# Patient Record
Sex: FEMALE | Race: OTHER | Employment: UNEMPLOYED | URBAN - METROPOLITAN AREA
[De-identification: names, ages, dates, MRNs, and addresses within clinical notes are randomized per-mention and may not be internally consistent; named-entity substitution may affect disease eponyms.]

---

## 2018-01-22 PROBLEM — M25.562 CHRONIC PAIN OF BOTH KNEES: Status: ACTIVE | Noted: 2018-01-22

## 2018-01-22 PROBLEM — G89.29 CHRONIC PAIN OF BOTH KNEES: Status: ACTIVE | Noted: 2018-01-22

## 2018-01-22 PROBLEM — M25.561 CHRONIC PAIN OF BOTH KNEES: Status: ACTIVE | Noted: 2018-01-22

## 2018-01-22 NOTE — PATIENT INSTRUCTIONS
Encounter to establish care  (primary encounter diagnosis) request records from previous PCP  Other specified hypothyroidism- check TSH today   Chronic knee pain -most likely osteoarthritits, nsaid as needed , exercise , instruction given   frontal headach

## 2018-01-22 NOTE — PROGRESS NOTES
HPI:    Patient ID: Timbo Beatty is a 60year old female. HPI she is here to establish care with new physician . She states she is having chronic pain on her knees , frontal  headache on and off .  She is using glasses  But  she  has not seen eye md for fe Current Outpatient Prescriptions:  Calcium Carbonate-Vitamin D (CALCIUM 600-D) 600-400 MG-UNIT Oral Tab Take by mouth. Disp:  Rfl:    aspirin (ECOTRIN LOW STRENGTH) 81 MG Oral Tab EC Take by mouth.  Disp:  Rfl:    Levothyroxine Sodium 150 MCG Oral Tab T tracheal tenderness present. No tracheal deviation present. No thyroid mass and no thyromegaly present. Cardiovascular: Normal rate, regular rhythm, normal heart sounds and intact distal pulses. Exam reveals no gallop and no friction rub.     No murmur h

## 2018-01-22 NOTE — TELEPHONE ENCOUNTER
Medical record request signed and mailed      Dr MERCADO [facility]  [address] [id number]

## 2018-01-23 NOTE — TELEPHONE ENCOUNTER
Pt has been informed of lab results and the increase in dose on the thyroid medication, pt voiced understanding.

## 2018-04-04 NOTE — TELEPHONE ENCOUNTER
Patient states since taking the higher dose of thyroid medication she has notice severe dry mouth and hair keeps falling off with joint pain. Is there something she should do or just continue taking the medication . Please advice .  German speaking only

## 2018-04-04 NOTE — TELEPHONE ENCOUNTER
She needs to come and we need to check her thyrpid again, can come tomorrow, I put the order and will take form there

## 2018-04-13 NOTE — TELEPHONE ENCOUNTER
Have tried calling several times and line becomes d/c after a few rings, pt has appt at end of April.

## 2018-04-30 PROBLEM — G89.29 CHRONIC MIDLINE LOW BACK PAIN WITHOUT SCIATICA: Status: ACTIVE | Noted: 2018-04-30

## 2018-04-30 PROBLEM — M54.50 CHRONIC MIDLINE LOW BACK PAIN WITHOUT SCIATICA: Status: ACTIVE | Noted: 2018-04-30

## 2018-04-30 NOTE — PATIENT INSTRUCTIONS
ASSESSMENT/PLAN:   Breast cancer screening  (primary encounter diagnosis) Check mammogram. Continue self breast exam every month. Other specified hypothyroidism Clinically, hypothyroid. Check blood. Osteopenia, unspecified location Dexa done 2017. balanced. They also support your body when you move. By distributing your weight throughout your spine, the curves make back injuries less likely. Strong, flexible muscles  Strong, flexible back muscles help support the three curves of the spine.  They do los trastornos [employer], la artritis en las articulaciones vertebrales o la estenosis (estrechamiento) del canal medular pueden convertirse en problemas crónicos y durar meses o años. [address]:  1.  PARA EL DOLOR DE GENA: Use dixie almohada libras hasta que el dolor haya desaparecido por completo. Programe dixie VISITA DE CONTROL con gaona médico o con dorinda centro si los síntomas no empiezan a mejorar después de Mervat Audrey. Es posible que necesite fisioterapia o pruebas adicionales.   [NOTA: Si le espalda.   Músculos benjy y flexibles  Es importante tener músculos benjy y flexibles en la espalda, para ayudar la columna a sostener las shaila curvas dorsales y mantener la alineación correcta de las vértebras y los discos; y también en el abdomen, las vertebral.   · Kelley Garcia y angyr a paso rápido son buenas formas de ejercicio para mejorar gaona nivel de forma física. · Practique técnicas seguras para levantar objetos (juarez la información que se presenta más abajo).   · Adopte posturas correctas al estar sen izquierda [employer], con la espalda plana contra el piso. Sostenga la posición cirilo 5 segundos. · Relájese y repita el ejercicio con la rodilla derecha. · Repita el ejercicio 10 veces con cada pierna.   [employer]ant[employer] Giovani [employer] Segur donde duerme, cámbielo o use dixie tabla de zazueta contrachapada de media pulgada de espesor debajo del colchón para darle más soporte.   Programe dixie VISITA DE CONTROL con gaona médico, o según le indique nuestro personal médico.  [NOTA: Si le hicieron radiogra espalda contra el piso. Sostenga esta posición cirilo 5 segundos. · Relájese y repita el ejercicio con la rodilla derecha. · Shana dorinda ejercicio 10 veces con cada titona.   · Repita el ejercicio [address] y presionándolas contra el pecho de la espalda contra el suelo. Mientras mantiene la tensión de letitia músculos abdominales, respire dixie vez más y [employer] omóplatos del piso (esta posición no equivale a la de sentadilla completa).  Mantenga la ora alineada con el christy del cuerpo (no d radiografías (X-rays) y otras pruebas más adelante. [address]:  7. Es posible que necesite permanecer en cama los primeros días.  Linsey, tan pronto mark le sea posible, comience a sentarse o pararse para evitar los problemas que pueden aparecer cu ga.  Programe dixie VISITA DE CONTROL con gaona médico o dorinda centro si no empieza a sentirse mejor cirilo la semana próxima. Demetra Timewell necesite obtener terapia física (physical therapy).   [NOTA: Si le barnett hecho radiografías (X-rays), éstas serán evaluadas por tocarse y a comprimir el nervio.  A menudo, en el punto de roce de las vértebras crece dixie prominencia anormal de hueso (denominada espolón óseo), la cual puede provocar dixie estenosis (estrechamiento) del foramen o canal haque que genera presión contra un flexibilidad para prevenir nuevas lesiones. Pida a gaona médico que le aconseje ejercicios específicos para la espalda. Mantenga dixie buena postura para prevenir futuras lesiones  · Al moverse, doble las caderas y Gwenyth Kroner.  No doble la cintura ni gire el y la ora contra la pared. Sentirá dixie leve tensión en los hombros.  15. Mantenga esta posición y cuente WATZING 5. Luego bote Dru Rocher y relaje los hombros y el marvin.   Flexión del cuerpo  [employer] ayuda a fortalecer los músculos de la espalda y (desde los hombros [employer] caderas) se vuelva más flexible. 8. Siéntese en el piso con las piernas cruzadas. Gaona espalda debe estar derecha. 9. Ponga gaona mano izquierda sobre gaona Nishi Edman.  Coloque gaona mano derecha en el piso para apoyarse y ayudar a espolones óseos, capaces de estrechar el foramen o canal medular y provocar dixie estenosis. [place] también ejerce presión contra los nervios.    En algunos casos, las vértebras se desestabilizan y se deslizan hacia adelante; surge entonces la espondilolistesis kenji. · Pruebe a bañarse o ducharse con agua tibia. También puede usar dixie almohadilla térmica graduada en bajo. Para evitar quemarse, mantenga un paño entre gaona piel y la almohadilla térmica.   · No use dixie almohadilla térmica cirilo más de 15 minutos c conecte los puntos. · Si es necesario, mueva la pelvis ligeramente hacia adelante. Date Last Reviewed: 10/28/2015  © 8795-6603 [employer] [address]. [address] [facility], [address] [facility]. Todos los derechos reservados.  Esta información no prete proveedor de [employer] o farmacéutico si tiene alguna pregunta. Camine todos los días  Shana dixie caminata diaria para conservar la flexibilidad y la fuerza de los músculos de la espalda y los muslos. De esta forma, gaona espalda contará con mejor apoyo. tommy en el estómago, las nalgas y los muslos ayuda a que la espalda tenga que hacer menos esfuerzo. Date Last Reviewed: 8/31/2015  © 5504-7556 [employer] [address]. [address] [facility], [address] [place] [address] Friona. Todos los derechos reservados.  Esta info diagnosticar y tratar un problema de judith. Seguridad de la espalda: Los principios de dixie buena postura  La buena postura lo protege contra lesiones y lo hace sentirse más cómodo; propóngase mantener dixie postura adecuada todo el día.   Revise gaona pos tacón alto. · Al sentarse y dormir. Elija letitia muebles con cuidado; asegúrese de que no le vayan a causar o empeorar un dolor de espalda.  Las thomas deben permitirle sentarse cómodamente con dixie postura correcta; si es necesario, use almohadas para brindar profesional. Nolan Johnny gaona médico puede diagnosticar y tratar un problema de judith.         Ejercicios para la espalda: Estiramiento de rotadores de cadera    Para comenzar, acuéstese boca arriba con las rodillas flexionadas y las plantas de los pies apoyadas en judith.        Ejercicios para la espalda: Estiramiento de la parte inferior de la espalda    Para empezar, siéntese en dixie silla, apoyando las plantas de los pies en el suelo.  Desplace gaona peso ligeramente hacia adelante para no arquear la espalda.   Reláje gaona peso ligeramente hacia adelante para no arquear la espalda. Relájese. Mantenga las Gene, los hombros y las caderas alineados entre sí:  · Estire el brazo derecho por encima de la ora. · Inclínese lentamente a la izquierda, sin torcer el torso.  Det

## 2018-04-30 NOTE — PROGRESS NOTES
HPI:    Patient ID: Casi Reno is a 60year old female. Casi Reno is a 60year old female who presents for a complete physical exam.   HPI:   Patient presents with: Follow - Up: Here today to follow up on thyroid problem.      Here to establish care with History:  No date: CHOLECYSTECTOMY  2006: COLONOSCOPY  No date: HYSTERECTOMY      Comment: 46 y/o. EDER and BSO and endometriosis and                cysts. no abNL paps. History reviewed. No pertinent family history.    Social History:  Social History lb 6.4 oz (71.4 kg)    BP Readings from Last 3 Encounters:  04/30/18 : 121/78  01/22/18 : 134/79  02/25/13 : 124/75    Labs:     Lab Results  Component Value Date/Time    10/16/2006 10:05 AM   K 4.1 10/16/2006 10:05 AM    10/16/2006 10:05 AM discharge and vaginal pain. Musculoskeletal: Positive for back pain. Skin: Negative for rash. Allergic/Immunologic: Negative for environmental allergies.    Neurological: Negative for dizziness, tingling, tremors, seizures, syncope, facial asymmetry, Constitutional: She is oriented to person, place, and time. Vital signs are normal. She appears well-developed and well-nourished. No distress. HENT:   Head: Normocephalic and atraumatic.    Right Ear: Tympanic membrane, external ear and ear canal dexter and no hordeolum. No foreign body present in the left eye. Right conjunctiva is not injected. Right conjunctiva has no hemorrhage. Left conjunctiva is not injected. Left conjunctiva has no hemorrhage. No scleral icterus.  Right eye exhibits normal extraocul deformity and no laceration.         Right knee: She exhibits normal range of motion, no swelling, no effusion, no ecchymosis, no deformity, no laceration, no erythema, normal alignment, no LCL laxity, normal patellar mobility, no bony tenderness, normal me Neurological: She is alert and oriented to person, place, and time. She displays no atrophy and no tremor. She exhibits normal muscle tone. Reflex Scores:       Patellar reflexes are 2+ on the right side and 2+ on the left side.   Skin: Skin is warm and

## 2018-05-05 NOTE — TELEPHONE ENCOUNTER
Pt was informed that we haven't receive blood test results. Will call Monday and request them. Verbalized understanding.

## 2018-05-08 NOTE — TELEPHONE ENCOUNTER
Called [employer] faxing lab results over for Dr. Ximena Bonds to review.    Told patient we will call her on Wednesday

## 2018-05-08 NOTE — TELEPHONE ENCOUNTER
Jordanian speaking pt waiting since 04-30 for her tests results. pt is concern because has been a week since she had it done

## 2018-05-10 PROBLEM — E03.9 ACQUIRED HYPOTHYROIDISM: Status: ACTIVE | Noted: 2018-05-10

## 2018-05-10 PROBLEM — E78.00 HIGH CHOLESTEROL: Status: ACTIVE | Noted: 2018-05-10

## 2018-05-10 NOTE — TELEPHONE ENCOUNTER
Just received results today. Sugars normal, electrolytes are normal kidney and liver functions are normal. White count and cell counts are all normal, including hemoglobin hematocrit. Thyroid is abnormal.  Which may explain palpitations and hot flashes.

## 2018-05-10 NOTE — TELEPHONE ENCOUNTER
Patient was informed of all lab test results. Verbalized understanding. No questions or concerns at the time of call. Nurse visit for TSH recheck scheduled.

## 2018-05-18 NOTE — TELEPHONE ENCOUNTER
Pt has been informed of the back X-ray but she states that she is still in a lot of pain and has been taking pain med more often, please advise.

## 2018-05-20 NOTE — TELEPHONE ENCOUNTER
May be better to try a trial of physical therapy and follow-up after physical therapy. Orders written. Also may be try a little bit of gabapentin 300 nightly. Next sent. Is not can work right away takes full couple weeks to work.

## 2018-05-23 NOTE — TELEPHONE ENCOUNTER
Pt has been informed of Md's advise, pt voiced understanding and asked that PT order be mailed to her. Order mailed.

## 2018-10-19 NOTE — TELEPHONE ENCOUNTER
Cough for one week could be due to viral infection as well, we can order cxr to make sure no pneumonia. I will send tessalon pearls for cough if nay fevr, chills , sob  Needs to call us. She needs shelia.

## 2018-10-19 NOTE — TELEPHONE ENCOUNTER
Patient with dry cough for 1 week has a little bit of phlegm no fever she is asking if she can have an rx until she come in next week to see MD

## 2018-10-23 NOTE — PROGRESS NOTES
HPI:    Patient ID: Caridad Ramirez is a 60year old female. HPI  Here for F/U thyroid. Also, cough. Review of Systems   Constitutional: Negative for activity change, appetite change, chills, diaphoresis, fatigue, fever and unexpected weight change. 1 tablet (150 mcg total) by mouth before breakfast. Disp: 90 tablet Rfl: 1   Calcium Carbonate-Vitamin D (CALCIUM 600-D) 600-400 MG-UNIT Oral Tab Take by mouth. Disp:  Rfl:    aspirin (ECOTRIN LOW STRENGTH) 81 MG Oral Tab EC Take by mouth.  Disp:  Rfl:    A Nose: Nose normal. No mucosal edema, rhinorrhea, nose lacerations, sinus tenderness, nasal deformity or nasal septal hematoma. No epistaxis. No foreign bodies. Right sinus exhibits no maxillary sinus tenderness and no frontal sinus tenderness.  Left sinu no tonsillar, no preauricular, no posterior auricular and no occipital adenopathy present. Head (left side): No submental, no submandibular, no tonsillar, no preauricular, no posterior auricular and no occipital adenopathy present.      She has no ce

## 2018-10-23 NOTE — PATIENT INSTRUCTIONS
ASSESSMENT/PLAN:   Acquired hypothyroidism  (primary encounter diagnosis) Check blood. High cholesterol Check blood. Cough ? RAD. Check singuilar 10 mg at night. Call if no better in 7 days.  Discussed with patient of side effects and use of these m

## 2018-10-28 NOTE — PROGRESS NOTES
CMP Normal (electrolytes, sugar, kidney and liver functions),   Lipid (choilesterol) is worse than prior. Careful with diet. Avoid red meat, shellfish, pork. Try not to lora foods. Exercise at least 30 minutes 4 times a week. Lower carb diet.   Recheck

## 2019-04-11 NOTE — TELEPHONE ENCOUNTER
Please review; protocol failed.     Requested Prescriptions     Pending Prescriptions Disp Refills   • LEVOTHYROXINE SODIUM 137 MCG Oral Tab [Pharmacy Med Name: LEVOTHYROXINE 137 MCG TABLET] 90 tablet 0     Sig: Take 137 mcg by mouth before breakfast.

## 2021-04-22 NOTE — TELEPHONE ENCOUNTER
Pt states that she now has a new PCP and Medicare insurance, no longer with [facility].

## 2024-03-11 ENCOUNTER — HOSPITAL ENCOUNTER (OUTPATIENT)
Dept: GENERAL RADIOLOGY | Facility: HOSPITAL | Age: 69
Discharge: HOME | End: 2024-03-11
Attending: INTERNAL MEDICINE
Payer: MEDICARE

## 2024-03-11 ENCOUNTER — HOSPITAL ENCOUNTER (OUTPATIENT)
Dept: BONE DENSITY | Facility: HOSPITAL | Age: 69
Discharge: HOME | End: 2024-03-11
Attending: INTERNAL MEDICINE
Payer: MEDICARE

## 2024-03-11 ENCOUNTER — HOSPITAL ENCOUNTER (OUTPATIENT)
Dept: MAMMOGRAPHY | Facility: HOSPITAL | Age: 69
Discharge: HOME | End: 2024-03-11
Attending: INTERNAL MEDICINE
Payer: MEDICARE

## 2024-03-11 DIAGNOSIS — Z13.820 SCREENING FOR OSTEOPOROSIS: ICD-10-CM

## 2024-03-11 DIAGNOSIS — Z12.31 ENCOUNTER FOR SCREENING MAMMOGRAM FOR MALIGNANT NEOPLASM OF BREAST: ICD-10-CM

## 2024-03-11 DIAGNOSIS — R13.10 DYSPHAGIA: ICD-10-CM

## 2024-03-11 PROCEDURE — 74246 X-RAY XM UPR GI TRC 2CNTRST: CPT | Performed by: INTERNAL MEDICINE

## 2024-03-11 PROCEDURE — 71046 X-RAY EXAM CHEST 2 VIEWS: CPT | Performed by: INTERNAL MEDICINE

## 2024-03-11 PROCEDURE — 77063 BREAST TOMOSYNTHESIS BI: CPT | Performed by: INTERNAL MEDICINE

## 2024-03-11 PROCEDURE — 77067 SCR MAMMO BI INCL CAD: CPT | Performed by: INTERNAL MEDICINE

## 2024-04-19 ENCOUNTER — HOSPITAL ENCOUNTER (OUTPATIENT)
Dept: BONE DENSITY | Age: 69
Discharge: HOME | End: 2024-04-19
Attending: INTERNAL MEDICINE
Payer: MEDICARE

## 2024-04-19 PROCEDURE — 77080 DXA BONE DENSITY AXIAL: CPT | Performed by: INTERNAL MEDICINE

## 2024-06-04 ENCOUNTER — HOSPITAL ENCOUNTER (EMERGENCY)
Facility: HOSPITAL | Age: 69
Discharge: HOME | End: 2024-06-04
Attending: EMERGENCY MEDICINE
Payer: MEDICARE

## 2024-06-04 ENCOUNTER — APPOINTMENT (OUTPATIENT)
Dept: CT IMAGING | Facility: HOSPITAL | Age: 69
End: 2024-06-04
Attending: EMERGENCY MEDICINE
Payer: MEDICARE

## 2024-06-04 VITALS
SYSTOLIC BLOOD PRESSURE: 154 MMHG | DIASTOLIC BLOOD PRESSURE: 85 MMHG | HEART RATE: 65 BPM | WEIGHT: 160 LBS | RESPIRATION RATE: 18 BRPM | TEMPERATURE: 98 F | OXYGEN SATURATION: 95 % | BODY MASS INDEX: 30.21 KG/M2 | HEIGHT: 61 IN

## 2024-06-04 DIAGNOSIS — R10.12 ABDOMINAL PAIN, LEFT UPPER QUADRANT: Primary | ICD-10-CM

## 2024-06-04 LAB
ALBUMIN SERPL-MCNC: 4.5 G/DL (ref 3.2–4.8)
ALBUMIN/GLOB SERPL: 1.6 {RATIO} (ref 1–2)
ALP LIVER SERPL-CCNC: 86 U/L
ALT SERPL-CCNC: 16 U/L
ANION GAP SERPL CALC-SCNC: 7 MMOL/L (ref 0–18)
AST SERPL-CCNC: 28 U/L (ref ?–34)
BASOPHILS # BLD AUTO: 0.04 X10(3) UL (ref 0–0.2)
BASOPHILS NFR BLD AUTO: 0.4 %
BILIRUB SERPL-MCNC: 0.9 MG/DL (ref 0.2–1.1)
BILIRUB UR QL: NEGATIVE
BUN BLD-MCNC: 10 MG/DL (ref 9–23)
BUN/CREAT SERPL: 11.4 (ref 10–20)
CALCIUM BLD-MCNC: 9.3 MG/DL (ref 8.7–10.4)
CHLORIDE SERPL-SCNC: 104 MMOL/L (ref 98–112)
CLARITY UR: CLEAR
CO2 SERPL-SCNC: 26 MMOL/L (ref 21–32)
CREAT BLD-MCNC: 0.88 MG/DL
DEPRECATED RDW RBC AUTO: 42.5 FL (ref 35.1–46.3)
EGFRCR SERPLBLD CKD-EPI 2021: 71 ML/MIN/1.73M2 (ref 60–?)
EOSINOPHIL # BLD AUTO: 0.04 X10(3) UL (ref 0–0.7)
EOSINOPHIL NFR BLD AUTO: 0.4 %
ERYTHROCYTE [DISTWIDTH] IN BLOOD BY AUTOMATED COUNT: 14.1 % (ref 11–15)
GLOBULIN PLAS-MCNC: 2.8 G/DL (ref 2–3.5)
GLUCOSE BLD-MCNC: 119 MG/DL (ref 70–99)
GLUCOSE UR-MCNC: NORMAL MG/DL
HCT VFR BLD AUTO: 40.9 %
HGB BLD-MCNC: 13.8 G/DL
HGB UR QL STRIP.AUTO: NEGATIVE
IMM GRANULOCYTES # BLD AUTO: 0.06 X10(3) UL (ref 0–1)
IMM GRANULOCYTES NFR BLD: 0.6 %
KETONES UR-MCNC: NEGATIVE MG/DL
LEUKOCYTE ESTERASE UR QL STRIP.AUTO: NEGATIVE
LYMPHOCYTES # BLD AUTO: 2.28 X10(3) UL (ref 1–4)
LYMPHOCYTES NFR BLD AUTO: 23.3 %
MCH RBC QN AUTO: 28 PG (ref 26–34)
MCHC RBC AUTO-ENTMCNC: 33.7 G/DL (ref 31–37)
MCV RBC AUTO: 83.1 FL
MONOCYTES # BLD AUTO: 0.47 X10(3) UL (ref 0.1–1)
MONOCYTES NFR BLD AUTO: 4.8 %
NEUTROPHILS # BLD AUTO: 6.88 X10 (3) UL (ref 1.5–7.7)
NEUTROPHILS # BLD AUTO: 6.88 X10(3) UL (ref 1.5–7.7)
NEUTROPHILS NFR BLD AUTO: 70.5 %
NITRITE UR QL STRIP.AUTO: NEGATIVE
OSMOLALITY SERPL CALC.SUM OF ELEC: 284 MOSM/KG (ref 275–295)
PH UR: 7 [PH] (ref 5–8)
PLATELET # BLD AUTO: 218 10(3)UL (ref 150–450)
POTASSIUM SERPL-SCNC: 4.2 MMOL/L (ref 3.5–5.1)
PROT SERPL-MCNC: 7.3 G/DL (ref 5.7–8.2)
PROT UR-MCNC: NEGATIVE MG/DL
RBC # BLD AUTO: 4.92 X10(6)UL
SODIUM SERPL-SCNC: 137 MMOL/L (ref 136–145)
SP GR UR STRIP: 1.01 (ref 1–1.03)
UROBILINOGEN UR STRIP-ACNC: NORMAL
WBC # BLD AUTO: 9.8 X10(3) UL (ref 4–11)

## 2024-06-04 PROCEDURE — 74176 CT ABD & PELVIS W/O CONTRAST: CPT | Performed by: EMERGENCY MEDICINE

## 2024-06-04 PROCEDURE — 99285 EMERGENCY DEPT VISIT HI MDM: CPT

## 2024-06-04 PROCEDURE — 80053 COMPREHEN METABOLIC PANEL: CPT

## 2024-06-04 PROCEDURE — 99284 EMERGENCY DEPT VISIT MOD MDM: CPT

## 2024-06-04 PROCEDURE — 80053 COMPREHEN METABOLIC PANEL: CPT | Performed by: EMERGENCY MEDICINE

## 2024-06-04 PROCEDURE — 81003 URINALYSIS AUTO W/O SCOPE: CPT | Performed by: EMERGENCY MEDICINE

## 2024-06-04 PROCEDURE — 85025 COMPLETE CBC W/AUTO DIFF WBC: CPT | Performed by: EMERGENCY MEDICINE

## 2024-06-04 PROCEDURE — 81003 URINALYSIS AUTO W/O SCOPE: CPT

## 2024-06-04 PROCEDURE — 36415 COLL VENOUS BLD VENIPUNCTURE: CPT

## 2024-06-04 PROCEDURE — 85025 COMPLETE CBC W/AUTO DIFF WBC: CPT

## 2024-06-04 RX ORDER — ACETAMINOPHEN 500 MG
1000 TABLET ORAL ONCE
Status: COMPLETED | OUTPATIENT
Start: 2024-06-04 | End: 2024-06-04

## 2024-06-04 NOTE — ED INITIAL ASSESSMENT (HPI)
Pt arrives ambulatory to ED for c/o LLQ abd pain radiating to back/flank since 5 am today. Pt denies dysuria, states urgency to go. +nausea, denies diarrhea/vomiting. Pt took tylenol with no relief. Denies Hx kidney stones, hx of UTIs. Aox4, speaking in full sentences.     Daughter translating.

## 2024-06-05 NOTE — ED PROVIDER NOTES
Patient Seen in: [facility] Emergency Department    History     Chief Complaint   Patient presents with    Abdomen/Flank Pain       HPI    The patient presents to the ED complaining of left sided abdominal pain since 5 this morning that radiates towards her back.  She states pain is constant with associated nausea.  Denies other complaints.  Denies past abdominal problems.    History reviewed.   Past Medical History:    Hypothyroidism       History reviewed.   Past Surgical History:   Procedure Laterality Date    Cholecystectomy      Colonoscopy  2006    Hysterectomy      50 y/o. EDER and BSO and endometriosis and cysts. no abNL paps.          Medications :  (Not in a hospital admission)       No family history on file.    Smoking Status:   Social History     Socioeconomic History    Marital status:    Occupational History    Occupation: Homemaker.    Tobacco Use    Smoking status: Never    Smokeless tobacco: Never   Substance and Sexual Activity    Alcohol use: No    Drug use: No    Sexual activity: Yes     Partners: Male     Comment: .        Constitutional and vital signs reviewed.      Social History and Family History elements reviewed from today, pertinent positives to the presenting problem noted.    Physical Exam     ED Triage Vitals [06/04/24 1506]   /84   Pulse 68   Resp 18   Temp 97.7 °F (36.5 °C)   Temp src Temporal   SpO2 97 %   O2 Device None (Room air)       All measures to prevent infection transmission during my interaction with the patient were taken. Handwashing was performed prior to and after the exam.  Stethoscope and any equipment used during my examination was cleaned with super sani-cloth germicidal wipes following the exam.     Physical Exam  Vitals and nursing note reviewed.   Constitutional:       General: She is not in acute distress.     Appearance: She is well-developed.   HENT:      Head: Normocephalic and atraumatic.   Eyes:      General:         Right  eye: No discharge.         Left eye: No discharge.      Conjunctiva/sclera: Conjunctivae normal.   Neck:      Trachea: No tracheal deviation.   Cardiovascular:      Rate and Rhythm: Normal rate.   Pulmonary:      Effort: Pulmonary effort is normal. No respiratory distress.      Breath sounds: No stridor.   Abdominal:      General: There is no distension.      Palpations: Abdomen is soft.      Tenderness: There is abdominal tenderness in the left upper quadrant. There is no guarding or rebound.   Musculoskeletal:         General: No deformity.   Skin:     General: Skin is warm and dry.   Neurological:      Mental Status: She is alert and oriented to person, place, and time.   Psychiatric:         Mood and Affect: Mood normal.         Behavior: Behavior normal.         ED Course        Labs Reviewed   COMP METABOLIC PANEL (14) - Abnormal; Notable for the following components:       Result Value    Glucose 119 (*)     All other components within normal limits   CBC WITH DIFFERENTIAL WITH PLATELET    Narrative:     The following orders were created for panel order CBC With Differential With Platelet.                  Procedure                               Abnormality         Status                                     ---------                               -----------         ------                                     CBC W/ DIFFERENTIAL[[id number]]                              Final result                                                 Please view results for these tests on the individual orders.   URINALYSIS WITH CULTURE REFLEX   RAINBOW DRAW LAVENDER   RAINBOW DRAW LIGHT GREEN   CBC W/ DIFFERENTIAL       As Interpreted by me    Imaging Results Available and Reviewed while in ED: CT ABDOMEN+PELVIS KIDNEYSTONE 2D RNDR(NO IV,NO ORAL)(CPT=74176)    Result Date: 6/4/2024  CONCLUSION:   No acute abnormality in the abdomen or pelvis.  No finding to explain left-sided pain.  No renal or ureteral stones or hydronephrosis.   No small bowel obstruction.  Status post cholecystectomy with no biliary ductal dilatation.  Additional chronic or incidental findings are described in the body of this report.    elm-remote    Dictated by (CST): Contreras Campa MD on 6/04/2024 at 6:33 PM     Finalized by (CST): Contreras Campa MD on 6/04/2024 at 6:37 PM         ED Medications Administered:   Medications   acetaminophen (Tylenol Extra Strength) tab 1,000 mg (1,000 mg Oral Given 6/4/24 1751)         MDM     Vitals:    06/04/24 1506 06/04/24 1851   BP: 155/84 154/85   Pulse: 68 65   Resp: 18 18   Temp: 97.7 °F (36.5 °C)    TempSrc: Temporal    SpO2: 97% 95%   Weight: 72.6 kg    Height: 154.9 cm (5' 1\")      *I personally reviewed and interpreted all ED vitals.    Pulse Ox: 95%, Room air, Normal     Differential Diagnosis/ Diagnostic Considerations: Diverticulitis, urolithiasis, SBO, nonspecific bowel pain, other    Complicating Factors: The patient already has does not have any pertinent problems on file. to contribute to the complexity of this ED evaluation.    Medical Decision Making  Patient presents to the ED with left-sided abdominal pain.  Point tenderness to the left upper quadrant on exam.  CT without concerning findings and laboratory testing without abnormalities.  Patient reassured and stable for discharge with outpatient follow-up.    Problems Addressed:  Abdominal pain, left upper quadrant: acute illness or injury that poses a threat to life or bodily functions    Amount and/or Complexity of Data Reviewed  Labs: ordered. Decision-making details documented in ED Course.  Radiology: ordered and independent interpretation performed. Decision-making details documented in ED Course.     Details: Personally reviewed the patient's CT abdomen pelvis images and noted no SBO    Risk  OTC drugs.        Condition upon leaving the department: Stable    Disposition and Plan     Clinical Impression:  1. Abdominal pain, left upper quadrant         Disposition:  Discharge    Follow-up:  Nataly Miguel  [address] [facility]  [address] [place] [address] [id number] [phone]    Schedule an appointment as soon as possible for a visit in 3 day(s)        Medications Prescribed:  Discharge Medication List as of 6/4/2024  6:47 PM

## 2025-02-18 ENCOUNTER — OFFICE VISIT (OUTPATIENT)
Facility: CLINIC | Age: 70
End: 2025-02-18
Payer: MEDICARE

## 2025-02-18 VITALS
BODY MASS INDEX: 28.7 KG/M2 | HEART RATE: 62 BPM | DIASTOLIC BLOOD PRESSURE: 78 MMHG | WEIGHT: 152 LBS | HEIGHT: 61 IN | OXYGEN SATURATION: 98 % | SYSTOLIC BLOOD PRESSURE: 130 MMHG

## 2025-02-18 DIAGNOSIS — M85.80 OSTEOPENIA, UNSPECIFIED LOCATION: ICD-10-CM

## 2025-02-18 DIAGNOSIS — E03.9 ACQUIRED HYPOTHYROIDISM: Primary | ICD-10-CM

## 2025-02-18 PROCEDURE — 1159F MED LIST DOCD IN RCRD: CPT | Performed by: STUDENT IN AN ORGANIZED HEALTH CARE EDUCATION/TRAINING PROGRAM

## 2025-02-18 PROCEDURE — 1160F RVW MEDS BY RX/DR IN RCRD: CPT | Performed by: STUDENT IN AN ORGANIZED HEALTH CARE EDUCATION/TRAINING PROGRAM

## 2025-02-18 PROCEDURE — 3078F DIAST BP <80 MM HG: CPT | Performed by: STUDENT IN AN ORGANIZED HEALTH CARE EDUCATION/TRAINING PROGRAM

## 2025-02-18 PROCEDURE — 3008F BODY MASS INDEX DOCD: CPT | Performed by: STUDENT IN AN ORGANIZED HEALTH CARE EDUCATION/TRAINING PROGRAM

## 2025-02-18 PROCEDURE — 3075F SYST BP GE 130 - 139MM HG: CPT | Performed by: STUDENT IN AN ORGANIZED HEALTH CARE EDUCATION/TRAINING PROGRAM

## 2025-02-18 PROCEDURE — 99204 OFFICE O/P NEW MOD 45 MIN: CPT | Performed by: STUDENT IN AN ORGANIZED HEALTH CARE EDUCATION/TRAINING PROGRAM

## 2025-02-18 NOTE — H&P
[facility] Note    Name: Kaylie Wolff    Date: 2/18/2025    Kaylie Wolff is a 69 year old female who presents for evaluation of hypoTH management.     Chief complaint: New Patient (Establish care, management for hypothyroidism currently on levothyroxine 150mcg no concerns)       Subjective:   Initial HPI consult - 2/18/2025  Thyroid Hx:  -Diagnosed with hyperthyroidism around 16 years ago and completed radioiodine ablation. She has been on LT4 since. Initially saw endocrinology but has not seen once since.   -Medication Hx: LT4  -Family history- sisters with hx of thyroid disease    [] History of head/neck radiation   [x] Recent illness or contrast exposure  [x] Biotin Use  [] Amiodarone Use  [] Tobacco Use Hx     -Currently taking Thryoid Meds: LT4 150 mcg daily   -July 2024 told to take 150 mcg every other day  -August 2024 told to resume daily       she endorses temeperature intolerance and some instances of low voice   Denies other symptoms of compressive      History/Other:    Allergies, PMH, SocHx and FHx reviewed and updated as appropriate in Epic on    Levothyroxine Sodium 137 MCG Oral Tab Take 137 mcg by mouth every morning before breakfast. 90 tablet 1     Allergies[1]  Current Outpatient Medications   Medication Sig Dispense Refill    Levothyroxine Sodium 137 MCG Oral Tab Take 137 mcg by mouth every morning before breakfast. 90 tablet 1    Montelukast Sodium (SINGULAIR) 10 MG Oral Tab Take 1 tablet (10 mg total) by mouth nightly. 7 tablet 0    benzonatate 100 MG Oral Cap Take 1 capsule (100 mg total) by mouth 3 (three) times daily as needed. 30 capsule 0    gabapentin 300 MG Oral Cap Take 1 capsule (300 mg total) by mouth nightly. 30 capsule 3    Calcium Carbonate-Vitamin D (CALCIUM 600-D) 600-400 MG-UNIT Oral Tab Take by mouth.      aspirin (ECOTRIN LOW STRENGTH) 81 MG Oral Tab EC Take by mouth.      Alendronate Sodium 35 MG Oral Tab Take 35 mg by mouth every 7 days.       naproxen 500 MG Oral Tab Take 500 mg by mouth 2 (two) times daily with meals.       Past Medical History:    Hypothyroidism     History reviewed. No pertinent family history.  Social history: Reviewed.      ROS/Exam    REVIEW OF SYSTEMS: Ten point review of systems has been performed and is otherwise negative and/or non-contributory, except as described above.     VITALS  There were no vitals filed for this visit.    Wt Readings from Last 6 Encounters:   06/04/24 160 lb (72.6 kg)   10/23/18 149 lb (67.6 kg)   04/30/18 160 lb (72.6 kg)   01/22/18 160 lb (72.6 kg)       PHYSICAL EXAM  CONSTITUTIONAL:  awake, alert, cooperative, no apparent distress, and appears stated age    PSYCH: normal affect  LUNGS: breathing comfortably  CARDIOVASCULAR:  regular rate   NECK:  no palpable thyroid nodules      Labs/Imaging: Pertinent imaging reviewed.    Assessment & Plan:     ICD-10-CM    1. Acquired hypothyroidism  E03.9 TSH and Free T4 [E]      2. Osteopenia, unspecified location  M85.80           Kaylie Wolff is a pleasant 69 year old female here for evaluation of:    #Hypothyroidism  Lab Results   Component Value Date    TSH 0.10 (L) 10/23/2018    T4F 0.71 01/22/2018      PMHx of Hypothyroidism diagnosed in 2009.        - Pathophysiology of condition, goals of therapy,  d/w pt in detail.   - clinical significance of thyroid and antibody testing discussed   -Patient endorses cold intolerance    -2018 TFTs with TSH low      -Patient is compliant with current LT4 administration and is taking it appropriately   -Discussed with pt the proper administration of LT4: ideally first thing in the morning on an empty stomach and taken only with water, separate from all other foods/beverages/medications by 30-60 minutes.   -Once thyroid lab normalizes, associated symptoms directly related to the hypothyroidism should improved  - Pt aware that if symptoms do not improve despite normalization of thyroid lab, that they are  not likely related to their thyroid condition and that continued follow up with PCP is indicated.    Plan:  - med changes: Thryoid Meds: levothyroxine Tabs - 137 MCG  - lab orders placed and will discuss next steps once all labs result      #Osteopenia   4/2024 DXA with lowest t-score of -1.3 in lumbar spine; moderate FRAX  Repeat DXA 4/2026  Discussed 1200 mg of calcium and 1000 units of vitamin D daily as general recommendations    Return in about 6 months (around 8/18/2025).    The above plan was discussed in detail with the patient who verbalized understanding and agreement.      Emma Camacho DO  [facility]  2/18/2025     In reviewing this note, please be advised that Dragon Voice Recognition software used to dictate the note may have made errors in recognizing some of the words or phrases.     Note to patient: The 21 Century Cures Act makes medical notes like these available to patients in the interest of transparency. However, be advised this is a medical document. It is intended as peer to peer communication. It is written in medical language and may contain abbreviations or verbiage that are unfamiliar. It may appear blunt or direct. Medical documents are intended to carry relevant information, facts as evident, and the clinical opinion of the practitioner.            [1] No Known Allergies

## 2025-02-18 NOTE — PATIENT INSTRUCTIONS
Visit Summary  Hold biotin containing products for 5 days prior to completing your labs  We will refill your medication once your labs result    General follow up information:  Please let us know if you require any refills at least 1 week prior to your medication running out. If you do run out of medication, please call our office ASAP to request refills (do not wait until your follow up).  Please call us if you experience any problems with insurance coverage of medication, lab work, or imaging.   Lab results and imaging will typically be reviewed at follow up appointments, or within 3-5 business days of ALL results being in if you do not have an appointment scheduled in the near future. Our office will contact you for any abnormal results requiring more urgent follow up or action.  The on-call pager is for urgent matters only. If you are a type 1 diabetic and run out of insulin after business hours 8AM-4PM, you may call the on-call pager for a refill to a 24 hour pharmacy. If you have adrenal insufficiency and run out of steroids, you may call the on-call pager for a refill to a 24 hours pharmacy. All other refill requests should be requested during business hours.    Return Visit   [x]   Dr. Camacho in 6 months   [] Virtual visit  [] No follow up appointment needed  [] Follow up to be scheduled pending      []  Fasting/8AM labs  []  Central scheduling # for ultrasound/nuclear med/CT/MRI/DXA/IR  []  Provide flyer for:  [] ENT   [] Weight Management  [] Infertility/Reproductive Endocrinology  [] Transgender care  []  Directions to 1st floor lab  [] Collect urine collection jug  [] Collect salivary cortisol tubes  []  Dental clearance form  []  Will need authorization for outside records

## 2025-02-26 LAB
T4, FREE: 2 NG/DL (ref 0.8–1.8)
TSH: 0.11 MIU/L (ref 0.4–4.5)

## 2025-03-04 ENCOUNTER — TELEPHONE (OUTPATIENT)
Facility: CLINIC | Age: 70
End: 2025-03-04

## 2025-03-04 DIAGNOSIS — E03.9 ACQUIRED HYPOTHYROIDISM: Primary | ICD-10-CM

## 2025-03-04 NOTE — PROGRESS NOTES
Please have her decrease her levothyroxine from 137 mcg daily to 125 mcg daily since her current labs show overactivity.  Thank you

## 2025-03-04 NOTE — TELEPHONE ENCOUNTER
Currently taking 137 [facility]    Labs in [employer] for review.    Routed

## 2025-03-04 NOTE — TELEPHONE ENCOUNTER
03/04/2025, patient called in office to get the results from her labs that she just did and to see if she conitues on same medication dosage or will there will be changes. Please route to  And call patient at [phone] she will need a .

## 2025-03-04 NOTE — TELEPHONE ENCOUNTER
Reviewed and results messages-thyroid is overactive.  Please have her decrease levothyroxine from 137 to 125 mcg daily.  Repeat labs prior to follow-up visit in August.

## 2025-03-05 RX ORDER — LEVOTHYROXINE SODIUM 125 UG/1
125 TABLET ORAL
Qty: 90 TABLET | Refills: 1 | Status: SHIPPED | OUTPATIENT
Start: 2025-03-05

## 2025-03-05 NOTE — TELEPHONE ENCOUNTER
Please have her decrease her levothyroxine from 137 mcg daily to 125 mcg daily since her current labs show overactivity.      RN phoned [employer], : Vickie ID: [id number]    Spoke with patient with - reviewed note to verbalized understanding.

## 2025-06-09 ENCOUNTER — TELEPHONE (OUTPATIENT)
Facility: CLINIC | Age: 70
End: 2025-06-09

## 2025-06-09 DIAGNOSIS — E03.9 ACQUIRED HYPOTHYROIDISM: ICD-10-CM

## 2025-06-09 RX ORDER — LEVOTHYROXINE SODIUM 125 UG/1
125 TABLET ORAL
Qty: 90 TABLET | Refills: 1 | Status: SHIPPED | OUTPATIENT
Start: 2025-06-09

## 2025-06-09 NOTE — TELEPHONE ENCOUNTER
Received a call from Kaylie, patient Cymro speaking, informed that will call back with .     ID: [id number]  Name Cinthia  Phoned patient together with Albanian speaking  to no answer, left voicemail updating that RN spoke with patient's [facility] pharmacy regarding Levothyroxine 125mcg dose, per [facility] there is no refills left on file. Once doctor will review prescription, will send it to her [facility] pharmacy.

## 2025-06-09 NOTE — TELEPHONE ENCOUNTER
Received a call from daughter Kitty (ok per verbal release), stating that patient's [facility] pharmacy won't dispense the right dose of Levothyroxine 125mcg instead giving a dose of Levothyroxine 150mcg that was prescribed by a different provider couple years ago.   Phoned patient's [facility] pharmacy, spoke with pharmacist Jazzmine PETERSON, has no refills left for Levothyroxine 125mcg. Prescription pended, routed for review.     3/4/2025 \"Please have her decrease her levothyroxine from 137 mcg daily to 125 mcg daily since her current labs show overactivity.  Thank you.\"  Endocrine refill protocol for medications for hypothyroidism and hyperthyroidism    Protocol Criteria:  FAILED Reason: Abnormal labs    If all below requirements are met, send a 90-day supply with 1 refill per provider protocol.    Verify appointment with Endocrinology completed in the last 12 months or scheduled in the next 6 months.    Normal TSH result in the past 12 months   Review recent telephone encounters and mychart communications with patient to ensure a dose change has not occurred since last office visit that was not updated in the medication history list     Last completed office visit:2/18/2025 Emma Camacho DO   Last completed telemed visit: Visit date not found  Next scheduled Follow up:   Future Appointments   Date Time Provider Department Center   8/19/2025 11:30 AM Emma Camacho DO [id number]      Last TSH result:   TSH   Date Value Ref Range Status   02/25/2025 0.11 (L) 0.40 - 4.50 mIU/L Final   10/16/2006 2.490 0.350 - 5.500 uIU/mL Final

## 2025-07-11 ENCOUNTER — TELEPHONE (OUTPATIENT)
Dept: FAMILY MEDICINE CLINIC | Facility: CLINIC | Age: 70
End: 2025-07-11

## 2025-08-01 ENCOUNTER — TELEPHONE (OUTPATIENT)
Dept: FAMILY MEDICINE CLINIC | Facility: CLINIC | Age: 70
End: 2025-08-01

## 2025-08-02 ENCOUNTER — OFFICE VISIT (OUTPATIENT)
Dept: FAMILY MEDICINE CLINIC | Facility: CLINIC | Age: 70
End: 2025-08-02

## 2025-08-02 ENCOUNTER — LAB ENCOUNTER (OUTPATIENT)
Dept: LAB | Age: 70
End: 2025-08-02
Attending: NURSE PRACTITIONER

## 2025-08-02 VITALS
DIASTOLIC BLOOD PRESSURE: 84 MMHG | HEIGHT: 61 IN | BODY MASS INDEX: 29.49 KG/M2 | HEART RATE: 57 BPM | WEIGHT: 156.19 LBS | SYSTOLIC BLOOD PRESSURE: 152 MMHG

## 2025-08-02 DIAGNOSIS — F43.9 STRESS: ICD-10-CM

## 2025-08-02 DIAGNOSIS — Z12.31 VISIT FOR SCREENING MAMMOGRAM: ICD-10-CM

## 2025-08-02 DIAGNOSIS — Z00.00 ENCOUNTER FOR ANNUAL HEALTH EXAMINATION: Primary | ICD-10-CM

## 2025-08-02 DIAGNOSIS — Z13.6 SCREENING FOR CARDIOVASCULAR CONDITION: ICD-10-CM

## 2025-08-02 DIAGNOSIS — Z13.1 SCREENING FOR DIABETES MELLITUS (DM): ICD-10-CM

## 2025-08-02 DIAGNOSIS — G89.29 CHRONIC PAIN OF BOTH KNEES: ICD-10-CM

## 2025-08-02 DIAGNOSIS — G89.29 CHRONIC MIDLINE LOW BACK PAIN WITHOUT SCIATICA: ICD-10-CM

## 2025-08-02 DIAGNOSIS — M25.561 CHRONIC PAIN OF BOTH KNEES: ICD-10-CM

## 2025-08-02 DIAGNOSIS — E03.9 ACQUIRED HYPOTHYROIDISM: ICD-10-CM

## 2025-08-02 DIAGNOSIS — M54.50 CHRONIC MIDLINE LOW BACK PAIN WITHOUT SCIATICA: ICD-10-CM

## 2025-08-02 DIAGNOSIS — R51.9 CHRONIC NONINTRACTABLE HEADACHE, UNSPECIFIED HEADACHE TYPE: ICD-10-CM

## 2025-08-02 DIAGNOSIS — M25.562 CHRONIC PAIN OF BOTH KNEES: ICD-10-CM

## 2025-08-02 DIAGNOSIS — E78.00 HIGH CHOLESTEROL: ICD-10-CM

## 2025-08-02 DIAGNOSIS — F32.1 CURRENT MODERATE EPISODE OF MAJOR DEPRESSIVE DISORDER WITHOUT PRIOR EPISODE (HCC): ICD-10-CM

## 2025-08-02 DIAGNOSIS — G89.29 CHRONIC NONINTRACTABLE HEADACHE, UNSPECIFIED HEADACHE TYPE: ICD-10-CM

## 2025-08-02 LAB
ALBUMIN SERPL-MCNC: 4.7 G/DL (ref 3.2–4.8)
ALBUMIN/GLOB SERPL: 2.1 (ref 1–2)
ALP LIVER SERPL-CCNC: 82 U/L (ref 55–142)
ALT SERPL-CCNC: 23 U/L (ref 10–49)
ANION GAP SERPL CALC-SCNC: 9 MMOL/L (ref 0–18)
AST SERPL-CCNC: 24 U/L (ref ?–34)
BASOPHILS # BLD AUTO: 0.05 X10(3) UL (ref 0–0.2)
BASOPHILS NFR BLD AUTO: 0.6 %
BILIRUB SERPL-MCNC: 0.5 MG/DL (ref 0.2–1.1)
BUN BLD-MCNC: 13 MG/DL (ref 9–23)
BUN/CREAT SERPL: 12.7 (ref 10–20)
CALCIUM BLD-MCNC: 9.6 MG/DL (ref 8.7–10.4)
CHLORIDE SERPL-SCNC: 104 MMOL/L (ref 98–112)
CHOLEST SERPL-MCNC: 221 MG/DL (ref ?–200)
CO2 SERPL-SCNC: 28 MMOL/L (ref 21–32)
CREAT BLD-MCNC: 1.02 MG/DL (ref 0.55–1.02)
DEPRECATED RDW RBC AUTO: 41.2 FL (ref 35.1–46.3)
EGFRCR SERPLBLD CKD-EPI 2021: 59 ML/MIN/1.73M2 (ref 60–?)
EOSINOPHIL # BLD AUTO: 0.14 X10(3) UL (ref 0–0.7)
EOSINOPHIL NFR BLD AUTO: 1.8 %
ERYTHROCYTE [DISTWIDTH] IN BLOOD BY AUTOMATED COUNT: 13.5 % (ref 11–15)
FASTING PATIENT LIPID ANSWER: YES
FASTING STATUS PATIENT QL REPORTED: YES
GLOBULIN PLAS-MCNC: 2.2 G/DL (ref 2–3.5)
GLUCOSE BLD-MCNC: 101 MG/DL (ref 70–99)
HCT VFR BLD AUTO: 41.1 % (ref 35–48)
HDLC SERPL-MCNC: 51 MG/DL (ref 40–59)
HGB BLD-MCNC: 13.4 G/DL (ref 12–16)
IMM GRANULOCYTES # BLD AUTO: 0.02 X10(3) UL (ref 0–1)
IMM GRANULOCYTES NFR BLD: 0.3 %
LDLC SERPL CALC-MCNC: 134 MG/DL (ref ?–100)
LYMPHOCYTES # BLD AUTO: 3.07 X10(3) UL (ref 1–4)
LYMPHOCYTES NFR BLD AUTO: 39.1 %
MCH RBC QN AUTO: 27.3 PG (ref 26–34)
MCHC RBC AUTO-ENTMCNC: 32.6 G/DL (ref 31–37)
MCV RBC AUTO: 83.7 FL (ref 80–100)
MONOCYTES # BLD AUTO: 0.62 X10(3) UL (ref 0.1–1)
MONOCYTES NFR BLD AUTO: 7.9 %
NEUTROPHILS # BLD AUTO: 3.95 X10 (3) UL (ref 1.5–7.7)
NEUTROPHILS # BLD AUTO: 3.95 X10(3) UL (ref 1.5–7.7)
NEUTROPHILS NFR BLD AUTO: 50.3 %
NONHDLC SERPL-MCNC: 170 MG/DL (ref ?–130)
OSMOLALITY SERPL CALC.SUM OF ELEC: 292 MOSM/KG (ref 275–295)
PLATELET # BLD AUTO: 216 10(3)UL (ref 150–450)
POTASSIUM SERPL-SCNC: 3.9 MMOL/L (ref 3.5–5.1)
PROT SERPL-MCNC: 6.9 G/DL (ref 5.7–8.2)
RBC # BLD AUTO: 4.91 X10(6)UL (ref 3.8–5.3)
SODIUM SERPL-SCNC: 141 MMOL/L (ref 136–145)
TRIGL SERPL-MCNC: 202 MG/DL (ref 30–149)
TSI SER-ACNC: 1.48 UIU/ML (ref 0.55–4.78)
VLDLC SERPL CALC-MCNC: 37 MG/DL (ref 0–30)
WBC # BLD AUTO: 7.9 X10(3) UL (ref 4–11)

## 2025-08-02 PROCEDURE — 36415 COLL VENOUS BLD VENIPUNCTURE: CPT | Performed by: NURSE PRACTITIONER

## 2025-08-02 PROCEDURE — 80053 COMPREHEN METABOLIC PANEL: CPT | Performed by: NURSE PRACTITIONER

## 2025-08-02 PROCEDURE — 84443 ASSAY THYROID STIM HORMONE: CPT | Performed by: NURSE PRACTITIONER

## 2025-08-02 PROCEDURE — 80061 LIPID PANEL: CPT | Performed by: NURSE PRACTITIONER

## 2025-08-02 PROCEDURE — 85025 COMPLETE CBC W/AUTO DIFF WBC: CPT | Performed by: NURSE PRACTITIONER

## 2025-08-02 PROCEDURE — 83036 HEMOGLOBIN GLYCOSYLATED A1C: CPT | Performed by: NURSE PRACTITIONER

## 2025-08-03 LAB
EST. AVERAGE GLUCOSE BLD GHB EST-MCNC: 120 MG/DL (ref 68–126)
HBA1C MFR BLD: 5.8 % (ref ?–5.7)

## 2025-08-28 ENCOUNTER — HOSPITAL ENCOUNTER (OUTPATIENT)
Dept: MAMMOGRAPHY | Age: 70
Discharge: HOME | End: 2025-08-28
Attending: NURSE PRACTITIONER

## 2025-08-28 DIAGNOSIS — Z12.31 VISIT FOR SCREENING MAMMOGRAM: ICD-10-CM

## 2025-08-28 DIAGNOSIS — Z00.00 ENCOUNTER FOR ANNUAL HEALTH EXAMINATION: ICD-10-CM

## 2025-08-28 PROCEDURE — 77063 BREAST TOMOSYNTHESIS BI: CPT | Performed by: NURSE PRACTITIONER

## 2025-08-28 PROCEDURE — 77067 SCR MAMMO BI INCL CAD: CPT | Performed by: NURSE PRACTITIONER
